# Patient Record
(demographics unavailable — no encounter records)

---

## 2024-12-04 NOTE — PHYSICAL EXAM
[General Appearance - Alert] : alert [General Appearance - In No Acute Distress] : in no acute distress [General Appearance - Well Nourished] : well nourished [General Appearance - Well Developed] : well developed [General Appearance - Well-Appearing] : well appearing [Obese] : patient was observed to be obese [Appearance Of Head] : the head was normocephalic [Facies] : there were no dysmorphic facial features [Sclera] : the conjunctiva were normal [Outer Ear] : the ears and nose were normal in appearance [Examination Of The Oral Cavity] : mucous membranes were moist and pink [Auscultation Breath Sounds / Voice Sounds] : breath sounds clear to auscultation bilaterally [Normal Chest Appearance] : the chest was normal in appearance [Compression Test For Rib Fracture] : positive compression test for rib fracture [Rib Point Tenderness Left] : point tenderness was present [Tenderness Costochondral Junction Left] : tenderness [Apical Impulse] : quiet precordium with normal apical impulse [Heart Rate And Rhythm] : normal heart rate and rhythm [Heart Sounds] : normal S1 and S2 [No Murmur] : no murmurs  [Heart Sounds Gallop] : no gallops [Heart Sounds Pericardial Friction Rub] : no pericardial rub [Edema] : no edema [Arterial Pulses] : normal upper and lower extremity pulses with no pulse delay [Heart Sounds Click] : no clicks [Capillary Refill Test] : normal capillary refill [Bowel Sounds] : normal bowel sounds [Abdomen Soft] : soft [Nondistended] : nondistended [Abdomen Tenderness] : non-tender [Nail Clubbing] : no clubbing  or cyanosis of the fingers [Motor Tone] : normal muscle strength and tone [] : no rash [Skin Lesions] : no lesions [Skin Turgor] : normal turgor [Demonstrated Behavior - Infant Nonreactive To Parents] : interactive [Mood] : mood and affect were appropriate for age [Demonstrated Behavior] : normal behavior

## 2024-12-04 NOTE — CONSULT LETTER
[Today's Date] : [unfilled] [Name] : Name: [unfilled] [] : : ~~ [Today's Date:] : [unfilled] [Dear  ___:] : Dear Dr. [unfilled]: [Consult - Single Provider] : Thank you very much for allowing me to participate in the care of this patient. If you have any questions, please do not hesitate to contact me. [Sincerely,] : Sincerely, [FreeTextEntry4] : Shanel Dave MD  [FreeTextEntry5] : [address] [facility], [place] [address] [FreeTextEntry6] : [phone]

## 2024-12-04 NOTE — CARDIOLOGY SUMMARY
[Today's Date] : [unfilled] [FreeTextEntry1] : Normal sinus rhythm with a rate of 85, normal QRS axis, normal intervals, QTc 430.  No evidence of atrial or ventricular enlargement.  Normal T waves and ST segments.  No delta waves.

## 2024-12-04 NOTE — HISTORY OF PRESENT ILLNESS
[FreeTextEntry1] : I had the pleasure of seeing JADE KAUR in the pediatric cardiology clinic at [facility] on Dec 04, 2024. JADE is a 15 year-old girl    I had the pleasure of seeing JADE KAUR in the pediatric cardiology clinic at [facility] on December 20, 2023; she was last seen on Feberuary 1, 2023.  Jade is a 14-year-old girl who I follow for palpitations and occasional PVCs.    Today she reports that the "zapping" sensation (which she was having occasionally with the PVCs) has gotten better and has not been occurring as frequently as it was prior, but still is happening on occasion.  She has been having new symptoms of chest pain, mostly at rest.  The symptoms started a few weeks ago, and seem to occur randomly, but mostly at rest.  The pain occurs in her central chest and lasts for a few minutes.  The pain seems more dull than sharp.  She has no radiation of the pain to her face, neck, shoulders, arms or shoulders.  She had no injury to her chest recently.  No recent fevers.  She has occasional URI symptoms with colds.  There is no associated dizziness or syncope, no SOB or "zapping" sensations.    Recently started having palpitations again, this time a bit different.  Feeling lasting for ~ 15-20 seconds.  SOB.  No dizziness or syncope

## 2024-12-04 NOTE — REVIEW OF SYSTEMS
[Feeling Poorly] : not feeling poorly (malaise) [Fever] : no fever [Wgt Loss (___ Lbs)] : no recent weight loss [Pallor] : not pale [Eye Discharge] : no eye discharge [Redness] : no redness [Change in Vision] : no change in vision [Nasal Stuffiness] : no nasal congestion [Sore Throat] : no sore throat [Earache] : no earache [Loss Of Hearing] : no hearing loss [Cyanosis] : no cyanosis [Edema] : no edema [Diaphoresis] : not diaphoretic [Chest Pain] : chest pain  or discomfort [Exercise Intolerance] : no persistence of exercise intolerance [Palpitations] : palpitations [Orthopnea] : no orthopnea [Fast HR] : no tachycardia [Tachypnea] : not tachypneic [Wheezing] : no wheezing [Cough] : no cough [Shortness Of Breath] : not expressed as feeling short of breath [Vomiting] : no vomiting [Diarrhea] : no diarrhea [Abdominal Pain] : no abdominal pain [Decrease In Appetite] : appetite not decreased [Fainting (Syncope)] : no fainting [Seizure] : no seizures [Headache] : no headache [Dizziness] : no dizziness [Limping] : no limping [Joint Pains] : no arthralgias [Joint Swelling] : no joint swelling [Rash] : no rash [Wound problems] : no wound problems [Easy Bruising] : no tendency for easy bruising [Swollen Glands] : no lymphadenopathy [Easy Bleeding] : no ~M tendency for easy bleeding [Nosebleeds] : no epistaxis [Sleep Disturbances] : ~T no sleep disturbances [Hyperactive] : no hyperactive behavior [Depression] : no depression [Anxiety] : no anxiety [Failure To Thrive] : no failure to thrive [Short Stature] : short stature was not noted [Jitteriness] : no jitteriness [Heat/Cold Intolerance] : no temperature intolerance [Dec Urine Output] : no oliguria

## 2024-12-04 NOTE — DISCUSSION/SUMMARY
[FreeTextEntry1] : Jade is a 14-year-old girl known to have occasional isolated PVCs on remote rhythm monitoring, with occasional correlation with palpitations.   Today she comes in with concerns for new onset of chest pain - on exam she has reproducible pain with palpation of her costochondral joints.  I discussed the diagnosis of costochondritis with her and her father - given the short duration of her symptoms, it is unlikely that any medication would help this. However, if the pain worsens in frequency or duration she could try ibuprofen for the pain.   She had no PVCs on EKG and she had a normal heart rhythm on exam.    Recommendations: 1. F/U in 1 year; if she is having worsening of her palpitations, she can call the office and we can set up a home rhythm monitor   Holter

## 2024-12-04 NOTE — REASON FOR VISIT
[Follow-Up] : a follow-up visit for [Patient] : patient [Mother] : mother [FreeTextEntry3] : hx PVCs, "heart zap"/Chest pain

## 2024-12-04 NOTE — CONSULT LETTER
[Today's Date] : [unfilled] [Name] : Name: [unfilled] [] : : ~~ [Today's Date:] : [unfilled] [Dear  ___:] : Dear Dr. [unfilled]: [Consult - Single Provider] : Thank you very much for allowing me to participate in the care of this patient. If you have any questions, please do not hesitate to contact me. [Sincerely,] : Sincerely, [FreeTextEntry4] : Shanel Dave MD  [FreeTextEntry5] : [address] [facility], [place] [address] [FreeTextEntry6] : [phone]

## 2024-12-04 NOTE — HISTORY OF PRESENT ILLNESS
[FreeTextEntry1] : I had the pleasure of seeing JADE KAUR in the pediatric cardiology clinic at [facility] on Dec 04, 2024. JADE is a 15 year-old girl    I had the pleasure of seeing JADE KAUR in the pediatric cardiology clinic at [facility] on December 20, 2023; she was last seen on Feberuary 1, 2023.  Jade is a 14-year-old girl who I follow for palpitations and occasional PVCs.    Today she reports that the "zapping" sensation (which she was having occasionally with the PVCs) has gotten better and has not been occurring as frequently as it was prior, but still is happening on occasion.  She has been having new symptoms of chest pain, mostly at rest.  The symptoms started a few weeks ago, and seem to occur randomly, but mostly at rest.  The pain occurs in her central chest and lasts for a few minutes.  The pain seems more dull than sharp.  She has no radiation of the pain to her face, neck, shoulders, arms or shoulders.  She had no injury to her chest recently.  No recent fevers.  She has occasional URI symptoms with colds.  There is no associated dizziness or syncope, no SOB or "zapping" sensations.    Recently started having palpitations again, this time a bit different.  Feeling lasting for ~ 15-20 seconds.  SOB.  No dizziness or syncope

## 2025-06-26 NOTE — PHYSICAL EXAM
[No Neck Mass] : no neck mass was observed [No LAD] : no lymphadenopathy [Normal Rate and Effort] : normal respiratory rhythm and effort [No Crackles] : no crackles [Normal Rate] : heart rate was normal  [Normal S1, S2] : normal S1 and S2 [Regular Rhythm] : with a regular rhythm [Normal Cervical Lymph Nodes] : cervical [Skin Intact] : skin intact  [No Rash] : no rash [No Cyanosis] : no cyanosis [Normal Mood] : mood was normal [Normal Affect] : affect was normal [Judgment and Insight Age Appropriate] : judgement and insight is age appropriate [Wheezing] : no wheezing was heard [de-identified] : obese female

## 2025-06-26 NOTE — HISTORY OF PRESENT ILLNESS
[de-identified] : About 1 year ago she noted with shrimp ingestion - red bumps on her face - lasting for 20 minutes - resolved with no treatment.   She became very anxious.   She saw an allergist about 1 year ago - advised that she was allergic to shrimp and all shellfish.   She became more anxious and she is having panic episodes related to possible ingestion.    She thinks her throat is closing at times related to this ingestion.   Mild YESENIA - she will take Flonase but prn symptoms.

## 2025-06-26 NOTE — REASON FOR VISIT
[Initial Consultation] : an initial consultation for [Mother] : mother [FreeTextEntry3] : allergies

## 2025-06-26 NOTE — SOCIAL HISTORY
[House] : [unfilled] lives in a house  [None] : none [Single] : single [FreeTextEntry1] : Grade 10  Lives with parents - 2 siblings  [Smokers in Household] : there are no smokers in the home

## 2025-06-26 NOTE — ASSESSMENT
[FreeTextEntry1] : Anxiety disorder:  ImmunoCAP to crab, shrimp, lobster, scallop, squid, clam, mussel Immuno CAP tuna, salmon, tilapia, halibut, cod, swordfish,  If ImmunoCAP are negative she can introduce fish/shellfish into her diet